# Patient Record
Sex: MALE | Race: ASIAN | NOT HISPANIC OR LATINO | URBAN - METROPOLITAN AREA
[De-identification: names, ages, dates, MRNs, and addresses within clinical notes are randomized per-mention and may not be internally consistent; named-entity substitution may affect disease eponyms.]

---

## 2018-02-26 ENCOUNTER — EMERGENCY (EMERGENCY)
Facility: HOSPITAL | Age: 23
LOS: 1 days | Discharge: HOME | End: 2018-02-26
Attending: EMERGENCY MEDICINE | Admitting: EMERGENCY MEDICINE
Payer: COMMERCIAL

## 2018-02-26 VITALS
DIASTOLIC BLOOD PRESSURE: 55 MMHG | TEMPERATURE: 99 F | HEART RATE: 64 BPM | SYSTOLIC BLOOD PRESSURE: 124 MMHG | RESPIRATION RATE: 18 BRPM | OXYGEN SATURATION: 100 %

## 2018-02-26 PROCEDURE — 99284 EMERGENCY DEPT VISIT MOD MDM: CPT | Mod: 25

## 2018-02-26 NOTE — ED ADULT TRIAGE NOTE - CHIEF COMPLAINT QUOTE
Pt s/p head injury on Thursday. Pt states he hit his head on the molding of the door. Since the head injury pt c/o dizziness, fatigue and nausea. Pt states he has periods of forgetfulness. Pt states he was seen at the urgent care center and sent to the hospital for a CT scan.

## 2018-02-27 PROCEDURE — 70450 CT HEAD/BRAIN W/O DYE: CPT | Mod: 26

## 2018-02-27 RX ORDER — ONDANSETRON 8 MG/1
4 TABLET, FILM COATED ORAL ONCE
Qty: 0 | Refills: 0 | Status: COMPLETED | OUTPATIENT
Start: 2018-02-27 | End: 2018-02-27

## 2018-02-27 RX ORDER — ONDANSETRON 8 MG/1
1 TABLET, FILM COATED ORAL
Qty: 9 | Refills: 0 | OUTPATIENT
Start: 2018-02-27 | End: 2018-03-01

## 2018-02-27 NOTE — ED PROVIDER NOTE - CARE PLAN
Principal Discharge DX:	Concussion  Assessment and plan of treatment:	pls rest, drink plenty of fluids, take your antiemetics as prescribed, f/u with your pmd, return for any worsening symptoms or any other concerning symptoms

## 2018-02-27 NOTE — ED PROVIDER NOTE - OBJECTIVE STATEMENT
21 y/o M pt with no sig PMHx, BIB father, arrives to the ED c/o left sided HA and memory loss s/p slipping and falling 1 week ago, while leaving the bathroom. Pt reports "blacking out for 1-2 minutes." Father reports that pt went to Urgent care and was advised there to come to the ED for a Cat Scan. No meds. taken. Also c/o nausea and gait imbalances. Denies vomiting or any other complaints. No daily meds. NKDA

## 2018-02-27 NOTE — ED PROVIDER NOTE - MEDICAL DECISION MAKING DETAILS
21 y/o M pt with HA and memory loss. Possible Concussion. CT head, given persistent sx, and likely d/c home with precaution instructions given

## 2018-02-27 NOTE — ED PROVIDER NOTE - PLAN OF CARE
pls rest, drink plenty of fluids, take your antiemetics as prescribed, f/u with your pmd, return for any worsening symptoms or any other concerning symptoms

## 2019-08-10 ENCOUNTER — EMERGENCY (EMERGENCY)
Facility: HOSPITAL | Age: 24
LOS: 1 days | Discharge: HOME | End: 2019-08-10
Attending: EMERGENCY MEDICINE | Admitting: EMERGENCY MEDICINE
Payer: COMMERCIAL

## 2019-08-10 VITALS
OXYGEN SATURATION: 99 % | RESPIRATION RATE: 17 BRPM | DIASTOLIC BLOOD PRESSURE: 57 MMHG | TEMPERATURE: 98 F | SYSTOLIC BLOOD PRESSURE: 114 MMHG | HEART RATE: 63 BPM

## 2019-08-10 LAB
ALBUMIN SERPL ELPH-MCNC: 4.7 G/DL — SIGNIFICANT CHANGE UP (ref 3.3–5)
ALP SERPL-CCNC: 66 U/L — SIGNIFICANT CHANGE UP (ref 40–120)
ALT FLD-CCNC: 10 U/L — SIGNIFICANT CHANGE UP (ref 4–41)
ANION GAP SERPL CALC-SCNC: 13 MMO/L — SIGNIFICANT CHANGE UP (ref 7–14)
APPEARANCE UR: CLEAR — SIGNIFICANT CHANGE UP
APTT BLD: 26.3 SEC — LOW (ref 27.5–36.3)
AST SERPL-CCNC: 14 U/L — SIGNIFICANT CHANGE UP (ref 4–40)
BASE EXCESS BLDV CALC-SCNC: 4.7 MMOL/L — SIGNIFICANT CHANGE UP
BASOPHILS # BLD AUTO: 0.07 K/UL — SIGNIFICANT CHANGE UP (ref 0–0.2)
BASOPHILS NFR BLD AUTO: 0.9 % — SIGNIFICANT CHANGE UP (ref 0–2)
BILIRUB SERPL-MCNC: 0.4 MG/DL — SIGNIFICANT CHANGE UP (ref 0.2–1.2)
BILIRUB UR-MCNC: NEGATIVE — SIGNIFICANT CHANGE UP
BLD GP AB SCN SERPL QL: NEGATIVE — SIGNIFICANT CHANGE UP
BLOOD GAS VENOUS - CREATININE: 1.18 MG/DL — SIGNIFICANT CHANGE UP (ref 0.5–1.3)
BLOOD UR QL VISUAL: NEGATIVE — SIGNIFICANT CHANGE UP
BUN SERPL-MCNC: 15 MG/DL — SIGNIFICANT CHANGE UP (ref 7–23)
CALCIUM SERPL-MCNC: 9.8 MG/DL — SIGNIFICANT CHANGE UP (ref 8.4–10.5)
CHLORIDE BLDV-SCNC: 108 MMOL/L — SIGNIFICANT CHANGE UP (ref 96–108)
CHLORIDE SERPL-SCNC: 103 MMOL/L — SIGNIFICANT CHANGE UP (ref 98–107)
CO2 SERPL-SCNC: 26 MMOL/L — SIGNIFICANT CHANGE UP (ref 22–31)
COLOR SPEC: SIGNIFICANT CHANGE UP
CREAT SERPL-MCNC: 1.13 MG/DL — SIGNIFICANT CHANGE UP (ref 0.5–1.3)
EOSINOPHIL # BLD AUTO: 0.11 K/UL — SIGNIFICANT CHANGE UP (ref 0–0.5)
EOSINOPHIL NFR BLD AUTO: 1.4 % — SIGNIFICANT CHANGE UP (ref 0–6)
GAS PNL BLDV: 137 MMOL/L — SIGNIFICANT CHANGE UP (ref 136–146)
GLUCOSE BLDV-MCNC: 88 MG/DL — SIGNIFICANT CHANGE UP (ref 70–99)
GLUCOSE SERPL-MCNC: 77 MG/DL — SIGNIFICANT CHANGE UP (ref 70–99)
GLUCOSE UR-MCNC: NEGATIVE — SIGNIFICANT CHANGE UP
HCO3 BLDV-SCNC: 26 MMOL/L — SIGNIFICANT CHANGE UP (ref 20–27)
HCT VFR BLD CALC: 44 % — SIGNIFICANT CHANGE UP (ref 39–50)
HCT VFR BLDV CALC: 44.3 % — SIGNIFICANT CHANGE UP (ref 39–51)
HGB BLD-MCNC: 14.2 G/DL — SIGNIFICANT CHANGE UP (ref 13–17)
HGB BLDV-MCNC: 14.4 G/DL — SIGNIFICANT CHANGE UP (ref 13–17)
IMM GRANULOCYTES NFR BLD AUTO: 0.5 % — SIGNIFICANT CHANGE UP (ref 0–1.5)
INR BLD: 0.98 — SIGNIFICANT CHANGE UP (ref 0.88–1.17)
KETONES UR-MCNC: NEGATIVE — SIGNIFICANT CHANGE UP
LACTATE BLDV-MCNC: 1.8 MMOL/L — SIGNIFICANT CHANGE UP (ref 0.5–2)
LEUKOCYTE ESTERASE UR-ACNC: NEGATIVE — SIGNIFICANT CHANGE UP
LYMPHOCYTES # BLD AUTO: 2.44 K/UL — SIGNIFICANT CHANGE UP (ref 1–3.3)
LYMPHOCYTES # BLD AUTO: 30.3 % — SIGNIFICANT CHANGE UP (ref 13–44)
MCHC RBC-ENTMCNC: 29.2 PG — SIGNIFICANT CHANGE UP (ref 27–34)
MCHC RBC-ENTMCNC: 32.3 % — SIGNIFICANT CHANGE UP (ref 32–36)
MCV RBC AUTO: 90.3 FL — SIGNIFICANT CHANGE UP (ref 80–100)
MONOCYTES # BLD AUTO: 0.8 K/UL — SIGNIFICANT CHANGE UP (ref 0–0.9)
MONOCYTES NFR BLD AUTO: 9.9 % — SIGNIFICANT CHANGE UP (ref 2–14)
NEUTROPHILS # BLD AUTO: 4.6 K/UL — SIGNIFICANT CHANGE UP (ref 1.8–7.4)
NEUTROPHILS NFR BLD AUTO: 57 % — SIGNIFICANT CHANGE UP (ref 43–77)
NITRITE UR-MCNC: NEGATIVE — SIGNIFICANT CHANGE UP
NRBC # FLD: 0 K/UL — SIGNIFICANT CHANGE UP (ref 0–0)
PCO2 BLDV: 61 MMHG — HIGH (ref 41–51)
PH BLDV: 7.32 PH — SIGNIFICANT CHANGE UP (ref 7.32–7.43)
PH UR: 7 — SIGNIFICANT CHANGE UP (ref 5–8)
PLATELET # BLD AUTO: 219 K/UL — SIGNIFICANT CHANGE UP (ref 150–400)
PMV BLD: 11.4 FL — SIGNIFICANT CHANGE UP (ref 7–13)
PO2 BLDV: 32 MMHG — LOW (ref 35–40)
POTASSIUM BLDV-SCNC: 4.1 MMOL/L — SIGNIFICANT CHANGE UP (ref 3.4–4.5)
POTASSIUM SERPL-MCNC: 4.2 MMOL/L — SIGNIFICANT CHANGE UP (ref 3.5–5.3)
POTASSIUM SERPL-SCNC: 4.2 MMOL/L — SIGNIFICANT CHANGE UP (ref 3.5–5.3)
PROT SERPL-MCNC: 7.7 G/DL — SIGNIFICANT CHANGE UP (ref 6–8.3)
PROT UR-MCNC: NEGATIVE — SIGNIFICANT CHANGE UP
PROTHROM AB SERPL-ACNC: 10.9 SEC — SIGNIFICANT CHANGE UP (ref 9.8–13.1)
RBC # BLD: 4.87 M/UL — SIGNIFICANT CHANGE UP (ref 4.2–5.8)
RBC # FLD: 12.9 % — SIGNIFICANT CHANGE UP (ref 10.3–14.5)
RH IG SCN BLD-IMP: POSITIVE — SIGNIFICANT CHANGE UP
RH IG SCN BLD-IMP: POSITIVE — SIGNIFICANT CHANGE UP
SAO2 % BLDV: 52 % — LOW (ref 60–85)
SODIUM SERPL-SCNC: 142 MMOL/L — SIGNIFICANT CHANGE UP (ref 135–145)
SP GR SPEC: 1.01 — SIGNIFICANT CHANGE UP (ref 1–1.04)
UROBILINOGEN FLD QL: NORMAL — SIGNIFICANT CHANGE UP
WBC # BLD: 8.06 K/UL — SIGNIFICANT CHANGE UP (ref 3.8–10.5)
WBC # FLD AUTO: 8.06 K/UL — SIGNIFICANT CHANGE UP (ref 3.8–10.5)

## 2019-08-10 PROCEDURE — 99285 EMERGENCY DEPT VISIT HI MDM: CPT | Mod: 25

## 2019-08-10 PROCEDURE — 76705 ECHO EXAM OF ABDOMEN: CPT | Mod: 26

## 2019-08-10 RX ORDER — CIPROFLOXACIN LACTATE 400MG/40ML
400 VIAL (ML) INTRAVENOUS ONCE
Refills: 0 | Status: DISCONTINUED | OUTPATIENT
Start: 2019-08-10 | End: 2019-08-10

## 2019-08-10 RX ORDER — SODIUM CHLORIDE 9 MG/ML
2000 INJECTION INTRAMUSCULAR; INTRAVENOUS; SUBCUTANEOUS ONCE
Refills: 0 | Status: COMPLETED | OUTPATIENT
Start: 2019-08-10 | End: 2019-08-10

## 2019-08-10 RX ORDER — METRONIDAZOLE 500 MG
500 TABLET ORAL ONCE
Refills: 0 | Status: COMPLETED | OUTPATIENT
Start: 2019-08-10 | End: 2019-08-10

## 2019-08-10 RX ORDER — CIPROFLOXACIN LACTATE 400MG/40ML
VIAL (ML) INTRAVENOUS
Refills: 0 | Status: DISCONTINUED | OUTPATIENT
Start: 2019-08-10 | End: 2019-08-10

## 2019-08-10 RX ORDER — CIPROFLOXACIN LACTATE 400MG/40ML
400 VIAL (ML) INTRAVENOUS ONCE
Refills: 0 | Status: COMPLETED | OUTPATIENT
Start: 2019-08-10 | End: 2019-08-10

## 2019-08-10 RX ORDER — METRONIDAZOLE 500 MG
500 TABLET ORAL EVERY 8 HOURS
Refills: 0 | Status: DISCONTINUED | OUTPATIENT
Start: 2019-08-10 | End: 2019-08-10

## 2019-08-10 RX ORDER — MORPHINE SULFATE 50 MG/1
2 CAPSULE, EXTENDED RELEASE ORAL ONCE
Refills: 0 | Status: DISCONTINUED | OUTPATIENT
Start: 2019-08-10 | End: 2019-08-10

## 2019-08-10 RX ADMIN — MORPHINE SULFATE 2 MILLIGRAM(S): 50 CAPSULE, EXTENDED RELEASE ORAL at 22:40

## 2019-08-10 RX ADMIN — Medication 200 MILLIGRAM(S): at 23:26

## 2019-08-10 RX ADMIN — SODIUM CHLORIDE 2000 MILLILITER(S): 9 INJECTION INTRAMUSCULAR; INTRAVENOUS; SUBCUTANEOUS at 22:00

## 2019-08-10 RX ADMIN — MORPHINE SULFATE 2 MILLIGRAM(S): 50 CAPSULE, EXTENDED RELEASE ORAL at 22:30

## 2019-08-10 RX ADMIN — Medication 100 MILLIGRAM(S): at 22:11

## 2019-08-10 RX ADMIN — MORPHINE SULFATE 2 MILLIGRAM(S): 50 CAPSULE, EXTENDED RELEASE ORAL at 22:02

## 2019-08-10 RX ADMIN — Medication 500 MILLIGRAM(S): at 22:30

## 2019-08-10 RX ADMIN — MORPHINE SULFATE 2 MILLIGRAM(S): 50 CAPSULE, EXTENDED RELEASE ORAL at 22:10

## 2019-08-10 NOTE — ED ADULT NURSE NOTE - INTERVENTIONS DEFINITIONS
[place] to call system/Call bell, personal items and telephone within reach/Room bathroom lighting operational/Instruct patient to call for assistance

## 2019-08-10 NOTE — ED PROVIDER NOTE - OBJECTIVE STATEMENT
Adult male PMH anxiety, presents with 2 days abdominal pain, decrease PO intake and subjective fevers. 24 yr old M c h/o anxiety, presents with 2 days abdominal pain, decreased PO intake and subjective fevers.  Appears uncomfortable upon arrival.  States his siblings and father all have had appendicitis.  Pt is concerned he may have appy. No N/V/D. 24 yr old M c h/o anxiety, presents with 2 days abdominal pain, decreased PO intake and subjective fevers.  Appears uncomfortable upon arrival.  States his siblings and father all have had appendicitis.  Pt is concerned he may have appy. No N/V/D. No recent travel or known sick contacts.

## 2019-08-10 NOTE — ED PROVIDER NOTE - PROGRESS NOTE DETAILS
Patient became bradycardiac and diaphoretic while RN was in room doing blood work. Patient still c/o abdominal pain. No chest pain. No heart block on monitor. Patient normotensive. No LOC. Will monitor patient HR and complete 12 lead. Likely vasovagal secondary to abdominal pain or blood work. Subsequent to tania episode pt's abd pain worsened and tenderness and guarding extended to RUQ and epigastrum Subsequent to tania episode pt's abd pain worsened and tenderness and guarding extended to RUQ and epigastrium

## 2019-08-10 NOTE — ED ADULT TRIAGE NOTE - CHIEF COMPLAINT QUOTE
Pt c/o RLQ pain for a few days with associated nausea, vomiting, chills, subjective fevers. Denies diarrhea. Reports went to PMD yesterday and was told to go to ED for r/o appendicitis. Appears uncomfortable in triage. Denies pmhx. Pt c/o RLQ pain for a few days with associated nausea, vomiting, chills, subjective fevers that worsened in the last 30 min. Denies diarrhea. Reports went to PMD yesterday and was told to go to ED for r/o appendicitis. Appears uncomfortable in triage. Denies pmhx.

## 2019-08-10 NOTE — ED PROCEDURE NOTE - ATTENDING CONTRIBUTION TO CARE
Attending Attestation: Dr. Mikie ALVES supervised the resident and was personally present during key portions of the procedure.

## 2019-08-10 NOTE — ED ADULT NURSE NOTE - CHIEF COMPLAINT QUOTE
Pt c/o RLQ pain for a few days with associated nausea, vomiting, chills, subjective fevers that worsened in the last 30 min. Denies diarrhea. Reports went to PMD yesterday and was told to go to ED for r/o appendicitis. Appears uncomfortable in triage. Denies pmhx.

## 2019-08-10 NOTE — ED PROVIDER NOTE - NSFOLLOWUPINSTRUCTIONS_ED_ALL_ED_FT
DISCHARGE:    Pt was discharged home/self-care.    Pt was provided written discharge instructions.    Pt was asked to return to the ED immediately for any new or concerning symptoms or if they worsen (severe abdominal pain, irretraceable vomiting, fever that does not reduce with tylenol or motrin)     Pt instructed to follow-up with PCP in 2-5 days.   take Ibuprofen 600-800 mg as needed for pain. Pain should subside in a few days. Follow up with your PCP.     (If you don't have a PCP, call [facility] at [phone] to find a primary care physician. Or call [phone] to make an appointment with the clinic.)    If you had labs and/or imaging done, copies were given to you, the patient. Please bring these copies to your follow up appointments.   Pt was in agreement, endorsed understanding, and questions were answered.

## 2019-08-10 NOTE — ED PROVIDER NOTE - EKG ADDITIONAL INFORMATION FREE TEXT
Sinus bradycardia. Benigh early repol. No wpw, no brugada, no arrythmia, no ischemia. No heart block. Sinus bradycardia. Benign early repol. No wpw, no brugada, no arrythmia, no ischemia. No heart block.

## 2019-08-10 NOTE — ED ADULT NURSE NOTE - OBJECTIVE STATEMENT
pt on bed aox3 . c/o RLQ abdominal pain, constant non radiating, for 3 days worse today with Nausea Vomiting, Dizziness Lightheaded, Noted upon drawing blood and placing an IV lie Pt. claimed feels bout to passed out, cold clammy and diaphoretic. MD was informed and eval. the pt. on cm sinus tania at 45bpm . denies SOB CP palpitation will monitor, Denies PMHx

## 2019-08-10 NOTE — ED PROVIDER NOTE - CLINICAL SUMMARY MEDICAL DECISION MAKING FREE TEXT BOX
Right lower quad abd pain, decreased PO intake subjective fevers. will get ct scan for possible appendicitis. Right lower quad abd pain, decreased PO intake subjective fevers. will get ct scan for possible appendicitis.    This patient presents with abdominal pain of unclear etiology. A CT scan was performed to evaluate for potential causes of the abdominal pain, however, neither the clinical exam nor the CT has identified an emergent etiology for the abdominal pain. Specifically, given the benign exam, the laboratory studies, and unremarkable CT, I have a very low suspicion for appendicitis, ischemic bowel, bowel perforation, or any other life threatening disease. I have discussed with the patient the level of uncertainty with undifferentiated abdominal pain and clearly explained the need to follow-up as noted on the discharge instructions, or return to the Emergency Department immediately if the pain worsens, develops fever, persistent and uncontrollable vomiting, or for any new symptoms or concerns. Pt p/w RLQ pain and tenderness, guarding.  Also with systemic sx all concerning for acute appy.  Will send labs, provide analgesia, and obtain CTAP to r/o appy and assess for other intraabd pathology.

## 2019-08-10 NOTE — ED PROVIDER NOTE - ATTENDING CONTRIBUTION TO CARE
Attending Attestation: Dr. Deluna  I have personally performed a history and physical examination of the patient and discussed management with the resident as well as the patient.  I reviewed the resident's note and agree with the documented findings and plan of care.  I have authored and modified critical sections of the Provider Note, including but not limited to HPI, Physical Exam and MDM. Pt p/w RLQ pain and tenderness, guarding.  Also with systemic sx all concerning for acute appy.  Will send labs, provide analgesia, and obtain CTAP to r/o appy and assess for other intraabd pathology.

## 2019-08-11 VITALS
SYSTOLIC BLOOD PRESSURE: 142 MMHG | HEART RATE: 60 BPM | OXYGEN SATURATION: 100 % | RESPIRATION RATE: 16 BRPM | TEMPERATURE: 98 F | DIASTOLIC BLOOD PRESSURE: 65 MMHG

## 2019-08-11 PROCEDURE — 74177 CT ABD & PELVIS W/CONTRAST: CPT | Mod: 26

## 2019-08-11 RX ADMIN — Medication 400 MILLIGRAM(S): at 01:44

## 2019-08-11 NOTE — CONSULT NOTE ADULT - ASSESSMENT
24M p/w 4 days of abdominal pain. Pain associated with two episodes of emesis yesterday and some subjective fevers. Patient has never had this pain before, was sharp, stared in mid abdomen and then localized to the RLQ. Also endorses some diarrhea at home  Patient generally healthy, stopped taking klonipin for anxiety a few weeks ago. Daily marijuana user, occasional alcohol and cigarette.   VSS, labs with WBC 8, CT scan showing normal appendix.     -no surgical etiology for pain on CT scan at this time    Brittney Rooney, PGY-4

## 2019-08-11 NOTE — CONSULT NOTE ADULT - SUBJECTIVE AND OBJECTIVE BOX
GENERAL SURGERY CONSULT NOTE    24M p/w 4 days of abdominal pain. Pain associated with two episodes of emesis yesterday and some subjective fevers. Patient has never had this pain before, was sharp, stared in mid abdomen and then localized to the RLQ. Also endorses some diarrhea at home  Patient generally healthy, stopped taking klonipin for anxiety a few weeks ago. Daily marijuana user, occasional alcohol and cigarette.   VSS, labs with WBC 8, CT scan showing normal appendix.     PAST MEDICAL & SURGICAL HISTORY:  No pertinent past medical history  No significant past surgical history      FAMILY HISTORY:    [  ] Family history not pertinent as reviewed with the patient and family    SOCIAL HISTORY:    MEDICATIONS  (STANDING):    MEDICATIONS  (PRN):    Allergies    No Known Allergies    Intolerances    PHYSICAL EXAM    Vital Signs Last 24 Hrs  T(C): 36.8 (11 Aug 2019 02:47), Max: 37.2 (10 Aug 2019 22:00)  T(F): 98.2 (11 Aug 2019 02:47), Max: 98.9 (10 Aug 2019 22:00)  HR: 60 (11 Aug 2019 02:47) (44 - 63)  BP: 142/65 (11 Aug 2019 02:47) (111/55 - 142/65)  BP(mean): 77 (10 Aug 2019 23:37) (77 - 77)  RR: 16 (11 Aug 2019 02:47) (16 - 18)  SpO2: 100% (11 Aug 2019 02:47) (99% - 100%)  Daily     Daily     General: WN/WD NAD  Neurology: A&Ox3, nonfocal, ESPINAL x 4  Head:  Normocephalic, atraumatic  ENT:  Mucosa moist, no ulcerations  Neck:  Supple, no sinuses or palpable masses  Lymphatic:  No palpable cervical, supraclavicular, axillary or inguinal adenopathy  Respiratory: CTA B/L  CV: RRR, S1S2, no murmur  Abdominal: Soft, RLQ tenderness, no rebound or guarding   MSK: No edema, + peripheral pulses, FROM all 4 extremity                            14.2   8.06  )-----------( 219      ( 10 Aug 2019 21:10 )             44.0     08-10    142  |  103  |  15  ----------------------------<  77  4.2   |  26  |  1.13    Ca    9.8      10 Aug 2019 21:10    TPro  7.7  /  Alb  4.7  /  TBili  0.4  /  DBili  x   /  AST  14  /  ALT  10  /  AlkPhos  66  08-10    PT/INR - ( 10 Aug 2019 21:30 )   PT: 10.9 SEC;   INR: 0.98          PTT - ( 10 Aug 2019 21:30 )  PTT:26.3 SEC  Urinalysis Basic - ( 10 Aug 2019 23:20 )    Color: LIGHT YELLOW / Appearance: CLEAR / S.013 / pH: 7.0  Gluc: NEGATIVE / Ketone: NEGATIVE  / Bili: NEGATIVE / Urobili: NORMAL   Blood: NEGATIVE / Protein: NEGATIVE / Nitrite: NEGATIVE   Leuk Esterase: NEGATIVE / RBC: x / WBC x   Sq Epi: x / Non Sq Epi: x / Bacteria: x        IMAGING STUDIES:

## 2025-07-28 ENCOUNTER — EMERGENCY (EMERGENCY)
Facility: HOSPITAL | Age: 30
LOS: 1 days | End: 2025-07-28
Admitting: STUDENT IN AN ORGANIZED HEALTH CARE EDUCATION/TRAINING PROGRAM
Payer: MEDICAID

## 2025-07-28 VITALS
SYSTOLIC BLOOD PRESSURE: 128 MMHG | DIASTOLIC BLOOD PRESSURE: 71 MMHG | WEIGHT: 210.1 LBS | HEIGHT: 72 IN | HEART RATE: 63 BPM | RESPIRATION RATE: 16 BRPM | OXYGEN SATURATION: 96 % | TEMPERATURE: 98 F

## 2025-07-28 PROCEDURE — 99283 EMERGENCY DEPT VISIT LOW MDM: CPT
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
## 2025-07-29 VITALS
OXYGEN SATURATION: 96 % | TEMPERATURE: 98 F | DIASTOLIC BLOOD PRESSURE: 71 MMHG | SYSTOLIC BLOOD PRESSURE: 110 MMHG | RESPIRATION RATE: 17 BRPM | HEART RATE: 62 BPM

## 2025-07-29 PROCEDURE — 73610 X-RAY EXAM OF ANKLE: CPT | Mod: 26,LT

## 2025-07-29 PROCEDURE — 73630 X-RAY EXAM OF FOOT: CPT | Mod: 26,LT

## 2025-07-29 NOTE — ED PROVIDER NOTE - PHYSICAL EXAMINATION
MSK: diffuse swelling to left ankle/mid foot, no bony tenderness to ankle or foot, FROM of left foot and ankle, no ecchymoses or skin changes  sensation intact and equal b/l, strength 5/5 in b/l LE, distal pulses 2+ b/l, pt is ambulatory without gait disturbance

## 2025-07-29 NOTE — ED PROVIDER NOTE - NSFOLLOWUPINSTRUCTIONS_ED_ALL_ED_FT
no concerns
Follow-up with an orthopedic doctor within 1 week, referral list attached please call to make an appointment  Follow-up with your primary care doctor within 1 week  Rest, ice, elevate the affected ankle  Apply Aircast and Ace bandage daily, take it off while sleeping  Take ibuprofen 600 mg every 8 hours as needed for pain, take with food  Return to the ER for any worsening or concerning symptoms increased pain, worsening swelling or any other concerns
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
## 2025-07-29 NOTE — ED PROVIDER NOTE - PATIENT PORTAL LINK FT
You can access the FollowMyHealth Patient Portal offered by [facility] by registering at the following website: [email or web address]. By joining [employer]’s FollowMyHealth portal, you will also be able to view your health information using other applications (apps) compatible with our system.
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
## 2025-07-29 NOTE — ED PROVIDER NOTE - PROGRESS NOTE DETAILS
X-ray without evidence of fracture, patient placed in Aircast and given Ace bandage, recommend patient to follow-up with orthopedics, he will return to the ER with any worsening or concerning symptoms X-ray without evidence of fracture, patient placed in Aircast and given Ace bandage, offered crutches but states he has these at home, recommend patient to follow-up with orthopedics, he will return to the ER with any worsening or concerning symptoms